# Patient Record
Sex: FEMALE | Race: WHITE
[De-identification: names, ages, dates, MRNs, and addresses within clinical notes are randomized per-mention and may not be internally consistent; named-entity substitution may affect disease eponyms.]

---

## 2018-01-12 ENCOUNTER — HOSPITAL ENCOUNTER (EMERGENCY)
Dept: HOSPITAL 89 - ER | Age: 28
Discharge: HOME | End: 2018-01-12
Payer: COMMERCIAL

## 2018-01-12 VITALS — DIASTOLIC BLOOD PRESSURE: 86 MMHG | SYSTOLIC BLOOD PRESSURE: 108 MMHG

## 2018-01-12 VITALS — WEIGHT: 165 LBS | BODY MASS INDEX: 23.62 KG/M2 | HEIGHT: 70 IN

## 2018-01-12 DIAGNOSIS — Z77.098: Primary | ICD-10-CM

## 2018-01-12 PROCEDURE — 99281 EMR DPT VST MAYX REQ PHY/QHP: CPT

## 2018-01-12 NOTE — ER REPORT
History and Physical


Time Seen By MD:  07:48


HPI/ROS


CC:


Chemical exposure





HPI:


27-year-old female with a negative past medical history. Who is a nurse was 

taking down a suction container and they put a stabilizer, which is 5.6% 

chlorine. This is the same concentration is clearing that is extra strength by 

[employer]. A few drops splashed up into her face when the suction canister was 

dropped. Initially she had a slight tightness in her chest but had has 

resolved. She denies any coughing, hemoptysis, shortness of breath, chest pain 

chest pressure, palpitations, hoarseness of voice, dizziness. Pain scale 0 out 

of 10. I discussed this with the patient that this was probably a transient 

exposure and not industrial strength chlorine. She agrees.





ROS:


12 point review of systems essentially negative other than what's mentioned in 

history of present illness.





NURSES AND OLD MEDICAL RECORDS:


Reviewed





PMH:


Reviewed





SURGICAL HX:


Reviewed





FAMILY HX:


Noncontributory





SOCIAL HX:


She denies smoking alcohol or illicit drugs.





VITAL SIGNS:


Reviewed





CONSTITUTIONAL:


27-year-old female in no distress.





PHYSICAL EXAM:


HEENT:


Pupils equal round reactive to light and accommodate, EOMI, tympanic membranes 

pearly white umbo present with good light reflex. Lips dry mucous membranes 

moist gums nonbleeding uvula midline and rises equally with phonation, 

oropharynx noninjected, teeth intact.





NECK:


Neck supple, thyroid not appreciated, anterior and posterior cervical 

lymphadenopathy not appreciated. Trachea midline and rises equally with 

phonation.





CARDIAC:


S1-S2 regular rate rhythm no murmurs rubs or gallops.





LUNGS:


Lungs clear bilaterally posteriorly in all fields. Good air movement.





ABDOMEN:


Abdomen soft, nondistended, bowel sounds active in all 4 quadrants, no bruits 

noted, no CVA tenderness.





MUSCULOSKELETAL:


Strength 5 out of 5 x 4 extremities, no deformities noted.





NEUROLOGIC:


Patient alert and oriented by 3


Allergies:  


Coded Allergies:  


     No Known Drug Allergies (Unverified , 1/12/18)


Home Meds


No Active Prescriptions or Reported Meds


Constitutional





Vital Sign - Last 24 Hours








 1/12/18





 07:49


 


Temp 98.6


 


Pulse 60


 


Resp 16


 


B/P (MAP) 108/86


 


Pulse Ox 94


 


O2 Delivery Room Air











Medical Decision Making


ED Course/Re-evaluation


ED Course


Patient with transient exposure to household strength chlorine. Patient will be 

discharged to home follow up with PCP [facility] ED on a when necessary basis. Patient 

agrees with plan.


Re-evaluation


Medical decision-making including but not excluded minor exposure to chlorine, 

pulmonary contusion from chemical exposure.


Decision to Disposition Date:  Jan 12, 2018


Decision to Disposition Time:  08:06





Depart


Departure


Latest Vital Signs





Vital Signs








  Date Time  Temp Pulse Resp B/P (MAP) Pulse Ox O2 Delivery O2 Flow Rate FiO2


 


1/12/18 07:49 98.6 60 16 108/86 94 Room Air  








Impression:  


 Primary Impression:  


 Chlorine gas exposure


Condition:  Improved


Disposition:  HOME OR SELF-CARE


New Scripts


No Active Prescriptions or Reported Meds





Additional Instructions:  


Chlorine exposure. Follow-up with your regular physician on a when necessary 

basis. Return to work.





I and the staff wanted to thank you for allowing us to take care of your needs 

today in the emergency department at [facility]. We have tried to 

answer all of your questions and concerns. Please feel free to return to the 

emergency department for any further concerns or unanswered questions.











ALYSE JOE MD Jan 12, 2018 07:48

## 2018-05-21 ENCOUNTER — HOSPITAL ENCOUNTER (EMERGENCY)
Dept: HOSPITAL 89 - ER | Age: 28
Discharge: HOME | End: 2018-05-21
Payer: COMMERCIAL

## 2018-05-21 VITALS — SYSTOLIC BLOOD PRESSURE: 121 MMHG | DIASTOLIC BLOOD PRESSURE: 87 MMHG
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
## 2018-05-21 DIAGNOSIS — S61.032A: Primary | ICD-10-CM
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
## 2018-05-21 PROCEDURE — 86703 HIV-1/HIV-2 1 RESULT ANTBDY: CPT

## 2018-05-21 PROCEDURE — 36415 COLL VENOUS BLD VENIPUNCTURE: CPT

## 2018-05-21 PROCEDURE — 86803 HEPATITIS C AB TEST: CPT

## 2018-05-21 PROCEDURE — 86706 HEP B SURFACE ANTIBODY: CPT

## 2018-05-21 PROCEDURE — 99282 EMERGENCY DEPT VISIT SF MDM: CPT

## 2018-05-21 NOTE — ER REPORT
History and Physical


Time Seen By MD:  22:27


Hx. of Stated Complaint:  


DIRTY NEEDLE STICK, LEFT THUMB.


HPI/ROS


CHIEF COMPLAINT: Needlestick, left thumb





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 

patient when she accidentally stuck her left thumb with a contaminated needle.  

The source patient is low risk.  Patient's had hepatitis B vaccine series.


Allergies:  


Coded Allergies:  


     No Known Drug Allergies (Unverified , 5/21/18)


Home Meds


No Active Prescriptions or Reported Meds


Reviewed Nurses Notes:  Yes


Old Medical Records Reviewed:  Yes


Hx Substance Use Disorder:  No


Hx Alcohol Use:  No


Constitutional





Vital Sign - Last 24 Hours








 5/21/18





 22:18


 


Temp 98.0


 


Pulse 66


 


Resp 16


 


B/P (MAP) 121/87


 


Pulse Ox 99


 


O2 Delivery Room Air








Physical Exam


General appearance: Alert no distress.


Respiratory: Chest is non tender, lungs are clear to auscultation.


Cardiac: Regular rate and rhythm 


Extremities: Examination of the left hand reveals a tiny puncture froilan on the 

palmar surface of the thumb.





DIFFERENTIAL DIAGNOSIS: After history and physical exam differential diagnosis 

was considered for body fluid exposure





Medical Decision Making


Data Points


Laboratory





Hematology








Test


  5/21/18


23:00


 


HIV (1&2) Antibody


  Negative


(NEGATIVE)








Chemistry








Test


  5/21/18


23:00


 


HIV (1&2) Antibody


  Negative


(NEGATIVE)











ED Course/Re-evaluation


ED Course


Patient was admitted to an examination room.  H&P was done.  The differential 

diagnoses was considered.  On clinical examination.  Patient has a tiny 

puncture wound.  Patient's source patient is likely low risk.  Patient's 

advised against post exposure prophylaxis.  Patient's baseline blood tests were 

drawn.  Patient advised to follow-up with a jaw.


Decision to Disposition Date:  May 21, 2018


Decision to Disposition Time:  22:38





Depart


Departure


Latest Vital Signs





Vital Signs








  Date Time  Temp Pulse Resp B/P (MAP) Pulse Ox O2 Delivery O2 Flow Rate FiO2


 


5/21/18 22:18 98.0 66 16 121/87 99 Room Air  








Impression:  


 Primary Impression:  


 Needlestick injury accident with exposure to body fluid


Condition:  Improved


Disposition:  HOME OR SELF-CARE


New Scripts


No Active Prescriptions or Reported Meds


Patient Instructions:  Body Substance Exposure (ED)





Additional Instructions:  


Follow-up with a HR for repeat blood work at 6 weeks and 6 months.











KVNG CHRISTENSEN DO May 21, 2018 22:27